# Patient Record
Sex: MALE | Race: WHITE | NOT HISPANIC OR LATINO | Employment: OTHER | URBAN - METROPOLITAN AREA
[De-identification: names, ages, dates, MRNs, and addresses within clinical notes are randomized per-mention and may not be internally consistent; named-entity substitution may affect disease eponyms.]

---

## 2017-01-03 ENCOUNTER — TELEPHONE (OUTPATIENT)
Dept: DERMATOLOGY | Facility: CLINIC | Age: 57
End: 2017-01-03

## 2017-01-03 DIAGNOSIS — L40.9 PSORIASIS: ICD-10-CM

## 2017-01-03 DIAGNOSIS — L40.0 PSORIASIS VULGARIS: ICD-10-CM

## 2017-01-03 RX ORDER — CLOBETASOL PROPIONATE 0.5 MG/G
OINTMENT TOPICAL
Qty: 60 G | Refills: 0 | Status: CANCELLED | OUTPATIENT
Start: 2017-01-03

## 2017-01-03 NOTE — TELEPHONE ENCOUNTER
----- Message from Caitlyn Weston sent at 1/3/2017  8:07 AM CST -----  Contact: self [phone]  He is calling to get refills on his topicals.  The pharmacy has sent requests.  Please call him with any questions.  Thank you!

## 2017-01-25 ENCOUNTER — TELEPHONE (OUTPATIENT)
Dept: FAMILY MEDICINE | Facility: CLINIC | Age: 57
End: 2017-01-25

## 2017-01-25 NOTE — TELEPHONE ENCOUNTER
Spoke with patient - requesting only to see Dr Cervantes   Didn't schedule - see telephone msg & office notes

## 2017-02-20 DIAGNOSIS — L40.0 PSORIASIS VULGARIS: ICD-10-CM

## 2017-02-20 RX ORDER — TRIAMCINOLONE ACETONIDE 1 MG/G
OINTMENT TOPICAL
Qty: 454 G | Refills: 0 | Status: SHIPPED | OUTPATIENT
Start: 2017-02-20 | End: 2017-09-22 | Stop reason: SDUPTHER

## 2017-02-20 RX ORDER — CLOBETASOL PROPIONATE 0.5 MG/G
OINTMENT TOPICAL
Qty: 60 G | Refills: 0 | Status: SHIPPED | OUTPATIENT
Start: 2017-02-20 | End: 2017-09-22 | Stop reason: SDUPTHER

## 2017-04-19 ENCOUNTER — TELEPHONE (OUTPATIENT)
Dept: FAMILY MEDICINE | Facility: CLINIC | Age: 57
End: 2017-04-19

## 2017-04-19 NOTE — TELEPHONE ENCOUNTER
----- Message from Roseann Cruz sent at 4/19/2017 10:06 AM CDT -----  Contact: self  Patient called requesting a prescription to help him sleep    He recently lost his mother and can not sleep    Please call [phone] to advise.     Thanks

## 2017-04-19 NOTE — TELEPHONE ENCOUNTER
Patient stated he is going to urgent care - advised Dr Joaquin is no longer his PCP - scheduled appointment with Dr Tiwari

## 2017-04-20 ENCOUNTER — TELEPHONE (OUTPATIENT)
Dept: FAMILY MEDICINE | Facility: CLINIC | Age: 57
End: 2017-04-20

## 2017-04-20 NOTE — TELEPHONE ENCOUNTER
Phone rings and goes to busy signal.   Also attempted to contact patient yesterday, 4/19/17, with no answer. Left message on mother's voicemail asking patient return call.
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
## 2017-04-24 ENCOUNTER — OFFICE VISIT (OUTPATIENT)
Dept: FAMILY MEDICINE | Facility: CLINIC | Age: 57
End: 2017-04-24
Payer: MEDICAID

## 2017-04-24 VITALS
OXYGEN SATURATION: 97 % | DIASTOLIC BLOOD PRESSURE: 100 MMHG | RESPIRATION RATE: 18 BRPM | WEIGHT: 268.75 LBS | HEIGHT: 72 IN | SYSTOLIC BLOOD PRESSURE: 160 MMHG | BODY MASS INDEX: 36.4 KG/M2 | TEMPERATURE: 99 F | HEART RATE: 90 BPM

## 2017-04-24 DIAGNOSIS — F41.9 ANXIETY: ICD-10-CM

## 2017-04-24 DIAGNOSIS — Z63.8 FAMILY CONFLICT: ICD-10-CM

## 2017-04-24 DIAGNOSIS — R35.89 POLYURIA: ICD-10-CM

## 2017-04-24 DIAGNOSIS — I10 ESSENTIAL HYPERTENSION: Primary | ICD-10-CM

## 2017-04-24 DIAGNOSIS — F51.01 PRIMARY INSOMNIA: ICD-10-CM

## 2017-04-24 LAB
BILIRUB SERPL-MCNC: NORMAL MG/DL
BLOOD URINE, POC: NORMAL
COLOR, POC UA: YELLOW
GLUCOSE UR QL STRIP: NORMAL
KETONES UR QL STRIP: NORMAL
LEUKOCYTE ESTERASE URINE, POC: NORMAL
NITRITE, POC UA: NORMAL
PH, POC UA: 6
PROTEIN, POC: NORMAL
SPECIFIC GRAVITY, POC UA: 1.01
UROBILINOGEN, POC UA: NORMAL

## 2017-04-24 PROCEDURE — 99999 PR PBB SHADOW E&M-EST. PATIENT-LVL IV: CPT | Mod: PBBFAC,,, | Performed by: NURSE PRACTITIONER

## 2017-04-24 PROCEDURE — 99214 OFFICE O/P EST MOD 30 MIN: CPT | Mod: S$PBB,,, | Performed by: NURSE PRACTITIONER

## 2017-04-24 PROCEDURE — 99214 OFFICE O/P EST MOD 30 MIN: CPT | Mod: PBBFAC,PO | Performed by: NURSE PRACTITIONER

## 2017-04-24 PROCEDURE — 81001 URINALYSIS AUTO W/SCOPE: CPT | Mod: PBBFAC,PO | Performed by: NURSE PRACTITIONER

## 2017-04-24 RX ORDER — ZOLPIDEM TARTRATE 5 MG/1
TABLET ORAL
Refills: 0 | COMMUNITY
Start: 2017-04-20 | End: 2017-09-12

## 2017-04-24 SDOH — SOCIAL DETERMINANTS OF HEALTH (SDOH): OTHER SPECIFIED PROBLEMS RELATED TO PRIMARY SUPPORT GROUP: Z63.8

## 2017-04-24 NOTE — MR AVS SNAPSHOT
[facility]  [address] [facility] [address]  [place] [address]  Phone: [phone]  Fax: [phone]                  Donta Beckham   2017 10:00 AM   Office Visit    Description:  Male : 1960   Provider:  Margaret Palomino NP   Department:  [facility]           Reason for Visit     Hypertension     sleep problems           Diagnoses this Visit        Comments    Essential hypertension    -  Primary     Primary insomnia         Anxiety         Family conflict         Polyuria                To Do List           Future Appointments        Provider Department Dept Phone    2017 3:20 PM ELSA Tiwari MD [facility] [phone]      Goals (5 Years of Data)              9/10/15    10/14/14    3/28/14    LDL CHOLESTEROL < 130   142.0  Invalid, Trig>400.0  162.4      [facility]     [facility] On Call Nurse Care Line -  Assistance  Unless otherwise directed by your provider, please contact [facility], our nurse care line that is available for  assistance.     Registered nurses in the [facility] Center provide: appointment scheduling, clinical advisement, health education, and other advisory services.  Call: [phone] (toll free)               Medications           Message regarding Medications     Verify the changes and/or additions to your medication regime listed below are the same as discussed with your clinician today.  If any of these changes or additions are incorrect, please notify your healthcare provider.        STOP taking these medications     alprazolam (XANAX) 2 MG Tab Daily prn           Verify that the below list of medications is an accurate representation of the medications you are currently taking.  If none reported, the list may be blank. If incorrect, please contact your healthcare provider. Carry this list with you in case of emergency.           Current Medications     busPIRone (BUSPAR) 10 MG tablet Take 1  tablet (10 mg total) by mouth 2 (two) times daily as needed.    clobetasol (TEMOVATE) 0.05 % external solution USE ON SCALP ONE TO TWO TIMES DAILY AS NEEDED FOR SCALING OR ITCHING    cloNIDine (CATAPRES) 0.1 MG tablet     multivit, iron, min no.8, FA 9-0.4 mg Tab Take 1 tablet by mouth once daily.    quetiapine (SEROQUEL) 50 MG tablet Take 1 tablet (50 mg total) by mouth every evening.    thiamine 100 MG tablet Take 1 tablet (100 mg total) by mouth once daily.    tizanidine 2 mg Cap 1 - 2 po q 6 hour prn muscle spasm    aspirin (ECOTRIN) 81 MG EC tablet Take 1 tablet (81 mg total) by mouth once daily.    clobetasol 0.05% (TEMOVATE) 0.05 % Oint APPLY EXTERNALLY TO THE AFFECTED AREA TWICE DAILY AS NEEDED. AVOID CHRONIC USE    triamcinolone acetonide 0.1% (KENALOG) 0.1 % ointment APPLY TO THE AFFECTED AREA TWICE DAILY FOR 1 TO 2 WEEKS THEN AS NEEDED. AVOID CHRONIC USE    valsartan (DIOVAN) 160 MG tablet Take 1 tablet (160 mg total) by mouth once daily.    zolpidem (AMBIEN) 5 MG Tab TK 1 T PO QHS PRN           Clinical Reference Information           Your Vitals Were     BP Pulse Temp Resp Height Weight    160/100 90 98.5 °F (36.9 °C) (Oral) 18 6' (1.829 m) 121.9 kg (268 lb 11.9 oz)    SpO2 BMI             97% 36.45 kg/m2         Blood Pressure          Most Recent Value    BP  (!)  160/100      Allergies as of 4/24/2017     No Known Allergies      Immunizations Administered on Date of Encounter - 4/24/2017     None      Orders Placed During Today's Visit      Normal Orders This Visit    POCT urinalysis, dipstick or tablet reag       [facility] Sign-Up     Activating your [facility] account is as easy as 1-2-3!     1) Visit [email or web address], select Sign Up Now, enter this activation code and your date of birth, then select Next.  [email or web address]  Expires: 5/13/2017  1:17 AM      2) Create a username and password to use when you visit [facility] in the future and select a security question in case you lose your password and  select Next.    3) Enter your e-mail address and click Sign Up!    Additional Information  If you have questions, please e-mail [email or web address] or call [phone] to talk to our [facility] staff. Remember, [facility] is NOT to be used for urgent needs. For medical emergencies, dial 911.         Instructions    HE has been instructed to go directly to [place] to be evaluated today       Language Assistance Services     ATTENTION: Language assistance services are available, free of charge. Please call [phone].      ATENCIÓN: Si habla español, tiene a heredia disposición servicios gratuitos de asistencia lingüística. Llame al [phone].     CHÚ Ý: N?u b?n nói Ti?ng Vi?t, có các d?ch v? h? tr? ngôn ng? mi?n phí dành cho b?n. G?i s? [phone].         [facility] complies with applicable Federal civil rights laws and does not discriminate on the basis of race, color, national origin, age, disability, or sex.

## 2017-04-24 NOTE — PROGRESS NOTES
"Subjective:       Patient ID: Donta Beckham is a 56 y.o. male.    Chief Complaint: Hypertension and sleep problems  This patient was seen in the ED on yesterday and again this morning for insomnia.   HPI   Patient states he is here because he cannot fall asleep.  States he "is not changing clothes, not eating, and cannot think straight".  Vitals:    04/24/17 0942   BP: (!) 160/100   Pulse: 90   Resp: 18   Temp: 98.5 °F (36.9 °C)     BP Readings from Last 3 Encounters:   04/24/17 (!) 160/100   04/24/17 (!) 168/92   04/23/17 (!) 146/91     Lab Results   Component Value Date    HGBA1C 5.4 10/14/2014     CMP  Sodium   Date Value Ref Range Status   04/23/2017 140 136 - 145 mmol/L Final     Potassium   Date Value Ref Range Status   04/23/2017 4.4 3.5 - 5.1 mmol/L Final     Chloride   Date Value Ref Range Status   04/23/2017 101 95 - 110 mmol/L Final     CO2   Date Value Ref Range Status   04/23/2017 31 22 - 31 mmol/L Final     Glucose   Date Value Ref Range Status   04/23/2017 119 (H) 70 - 110 mg/dL Final     Comment:     The ADA recommends the following guidelines for fasting glucose:  Normal:       less than 100 mg/dL  Prediabetes:  100 mg/dL to 125 mg/dL  Diabetes:     126 mg/dL or higher       BUN, Bld   Date Value Ref Range Status   04/23/2017 12 9 - 21 mg/dL Final     Creatinine   Date Value Ref Range Status   04/23/2017 0.99 0.50 - 1.40 mg/dL Final     Calcium   Date Value Ref Range Status   04/23/2017 9.4 8.4 - 10.2 mg/dL Final     Total Protein   Date Value Ref Range Status   03/29/2017 6.2 6.0 - 8.4 g/dL Final     Albumin   Date Value Ref Range Status   03/29/2017 3.4 (L) 3.5 - 5.2 g/dL Final     Total Bilirubin   Date Value Ref Range Status   03/29/2017 0.8 0.2 - 1.3 mg/dL Final     Comment:     For infants and newborns, interpretation of results should be based  on gestational age, weight and in agreement with clinical  observations.  Premature Infant recommended reference ranges:  Up to 24 " "hours.............<8.0 mg/dL  Up to 48 hours............<12.0 mg/dL  3-5 days..................<15.0 mg/dL  6-29 days.................<15.0 mg/dL       Alkaline Phosphatase   Date Value Ref Range Status   03/29/2017 50 38 - 145 U/L Final     AST ([place])   Date Value Ref Range Status   04/30/2016 40 17 - 59 U/L Final     AST   Date Value Ref Range Status   03/29/2017 47 17 - 59 U/L Final     ALT   Date Value Ref Range Status   03/29/2017 73 (H) 10 - 44 U/L Final     Anion Gap   Date Value Ref Range Status   04/23/2017 8 8 - 16 mmol/L Final     eGFR if    Date Value Ref Range Status   04/23/2017 >60 >60 mL/min/1.73 m^2 Final     eGFR if non    Date Value Ref Range Status   04/23/2017 >60 >60 mL/min/1.73 m^2 Final     Comment:     Calculation used to obtain the estimated glomerular filtration  rate (eGFR) is the CKD-EPI equation. Since race is unknown   in our information system, the eGFR values for   -American and Non--American patients are given   for each creatinine result.       Review of Systems    States he found his mother dead in December and states "my anxiety is extreme and I cannot fall asleep".  States he has been taking Taxis everywhere because he "cannot drive because he is too tired"  States he has only been taking his clonidine and not his valsartan  States he is not taking xanax because he no longer has any. He is here requesting more meds to help him sleep.  Objective:      Physical Exam   Constitutional: He is oriented to person, place, and time. He appears well-developed and well-nourished.   HENT:   Head: Normocephalic and atraumatic.   Right Ear: Hearing and external ear normal.   Left Ear: Hearing and external ear normal.   Nose: Nose normal.   Cardiovascular: Normal rate, regular rhythm and normal heart sounds.    Pulmonary/Chest: Effort normal and breath sounds normal.   Abdominal: Soft. Bowel sounds are normal.   Neurological: He is alert " and oriented to person, place, and time.   Skin: Skin is warm and dry.        Psychiatric: His behavior is normal. Judgment and thought content normal. His mood appears anxious. His affect is labile. His speech is delayed. Cognition and memory are normal. He expresses no homicidal and no suicidal ideation. He expresses no suicidal plans and no homicidal plans.   He appears to have a flat affect  Denies any suicidal or homicidal ideation   States he just cannot deal with everything going on with loss of mother. States having considerable family discord related to the financial issues with loss of mother.    States he last slept Thursday (3 days ago) and he stays up all night and watch TV to fall asleep  He has a slow speech and flat speech pattern   Nursing note and vitals reviewed.          Assessment & Plan:       Essential hypertension    Primary insomnia    Anxiety    Family conflict    Polyuria  -     POCT urinalysis, dipstick or tablet reag        Spent 30 minutes with this patient planning immediate evaluation by psychiatry through his insurance crisis line.   I advised his that I would not be prescribing any further meds to help him sleep.  Instructed him that I was concerned about his current mental and psychological status and felt he needed immediate evaluation by psychiatric professional.    I placed a call to the 24 hour behavioral health crisis line on his insurance cared at [phone] and spoke with Candace. She assisted with finding someone who could evaluate him immediately.  She spoke with Mita at [facility] and the patient states he will go directly there for an evaluation   A cab was called to send this patient directly to [facility] per his request    No Follow-up on file.

## 2017-04-25 ENCOUNTER — TELEPHONE (OUTPATIENT)
Dept: FAMILY MEDICINE | Facility: CLINIC | Age: 57
End: 2017-04-25

## 2017-04-26 NOTE — TELEPHONE ENCOUNTER
See message below from Dr. Tiwari. Please advise. Thank you.  Attempted to contact pt. No answer. Unable to LM due to busy signal.

## 2017-09-12 ENCOUNTER — OFFICE VISIT (OUTPATIENT)
Dept: FAMILY MEDICINE | Facility: CLINIC | Age: 57
End: 2017-09-12
Payer: MEDICAID

## 2017-09-12 VITALS
BODY MASS INDEX: 37.62 KG/M2 | HEIGHT: 72 IN | WEIGHT: 277.75 LBS | RESPIRATION RATE: 16 BRPM | HEART RATE: 100 BPM | SYSTOLIC BLOOD PRESSURE: 206 MMHG | DIASTOLIC BLOOD PRESSURE: 124 MMHG

## 2017-09-12 DIAGNOSIS — I10 ESSENTIAL HYPERTENSION: Primary | ICD-10-CM

## 2017-09-12 PROCEDURE — 99212 OFFICE O/P EST SF 10 MIN: CPT | Mod: S$PBB,,, | Performed by: FAMILY MEDICINE

## 2017-09-12 PROCEDURE — 99999 PR PBB SHADOW E&M-EST. PATIENT-LVL III: CPT | Mod: PBBFAC,,, | Performed by: FAMILY MEDICINE

## 2017-09-12 PROCEDURE — 99213 OFFICE O/P EST LOW 20 MIN: CPT | Mod: PBBFAC,PO | Performed by: FAMILY MEDICINE

## 2017-09-12 PROCEDURE — 3008F BODY MASS INDEX DOCD: CPT | Mod: ,,, | Performed by: FAMILY MEDICINE

## 2017-09-12 PROCEDURE — 3077F SYST BP >= 140 MM HG: CPT | Mod: ,,, | Performed by: FAMILY MEDICINE

## 2017-09-12 PROCEDURE — 3080F DIAST BP >= 90 MM HG: CPT | Mod: ,,, | Performed by: FAMILY MEDICINE

## 2017-09-14 ENCOUNTER — TELEPHONE (OUTPATIENT)
Dept: FAMILY MEDICINE | Facility: CLINIC | Age: 57
End: 2017-09-14

## 2017-09-14 NOTE — TELEPHONE ENCOUNTER
----- Message from Parul Go sent at 9/13/2017  2:59 PM CDT -----  Contact: self  Patient called regarding a phone call to receive today regarding his medications. Stating has not received call. Please contact [phone] (home)

## 2017-09-15 ENCOUNTER — TELEPHONE (OUTPATIENT)
Dept: FAMILY MEDICINE | Facility: CLINIC | Age: 57
End: 2017-09-15

## 2017-09-15 NOTE — TELEPHONE ENCOUNTER
----- Message from Parul Go sent at 9/14/2017  4:33 PM CDT -----  Contact: self  Patient called regarding missed call. Contacted since last week about medications, upset still waiting to be sent to pharmacy. Please contact [phone]
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
## 2017-09-15 NOTE — TELEPHONE ENCOUNTER
"Returned phone call from patient who is requesting refill requests for sleep aid and anxiety medications.   Call placed to patient to inquire about status and recent ED visit.   Patient states that the ED informed him that his blood pressure had dropped 60 points by the time he had arrived and that it was just an anxiety attack. States that ED MD told him to have his PCP issue provide him with his anxiety medication because that is all that was wrong with him.     Advised patient that in fact, he had not seen a provider in the ED, that only the triage nurse had seen him. Advised that his blood pressure was still elevated on assessment at ED. And that records indicated he had in fact left the hospital within 40 minutes of his arrival without treatment. The patient states that they weren't going to do anything for him anyway and that he goes to the emergency room all the time and they refuse to help him with his anxiety.   Explained that he was sent to the emergency room to address his blood pressure and did not follow through with treatment there, and further, was not honest about his encounter there.   It was also reminded that in his clinic visit, it was discussed that his anxiety should be followed through psychiatry since the patient has had stays in [place] for anxiety and alcohol abuse. The patient states no one ever told him to follow up with psychiatry and that his primary physician was supposed to take care of his anxiety medications.   Patient became louder stating, "You nitinks are supposed to be helping me and you aren't doing anything. You are the most uncaring nurse. You have no compassion at all. It was just like this with that corey Joaquin when I went to see him. It's bullshit." Patient requested my full name for the purpose of reporting me to management.     Advised patient that I was sorry that he felt this way, and that the PCP could not provide him with the medication he requested if he could not " follow the advise of his physician to seek treatment for both his anxiety and hypertension through the proper channels. The patient continued to yell out, and he was advised that the conversation would be ending at this time.

## 2017-09-15 NOTE — TELEPHONE ENCOUNTER
This patient left a medical facility AMA.  Dr. Tiwari has referred him to seek Psychiatric attention.  LF

## 2017-09-21 NOTE — TELEPHONE ENCOUNTER
Confirmed appt w/ Sayra dulce maria . Pt does not want to go to [facility] , pt wants to see Sayra.- Appt confirmed.-lp

## 2017-09-21 NOTE — TELEPHONE ENCOUNTER
----- Message from Caitlyn Weston sent at 9/21/2017 11:26 AM CDT -----  Contact: self [phone]  Call placed to pod. Patient returned your call, please call back.  Thank you!

## 2017-09-22 ENCOUNTER — OFFICE VISIT (OUTPATIENT)
Dept: FAMILY MEDICINE | Facility: CLINIC | Age: 57
End: 2017-09-22
Payer: MEDICAID

## 2017-09-22 VITALS
BODY MASS INDEX: 36.54 KG/M2 | WEIGHT: 269.75 LBS | RESPIRATION RATE: 22 BRPM | HEIGHT: 72 IN | TEMPERATURE: 98 F | SYSTOLIC BLOOD PRESSURE: 150 MMHG | OXYGEN SATURATION: 98 % | DIASTOLIC BLOOD PRESSURE: 100 MMHG | HEART RATE: 82 BPM

## 2017-09-22 DIAGNOSIS — Z00.00 LABORATORY EXAM ORDERED AS PART OF ROUTINE GENERAL MEDICAL EXAMINATION: ICD-10-CM

## 2017-09-22 DIAGNOSIS — G47.00 INSOMNIA, UNSPECIFIED TYPE: ICD-10-CM

## 2017-09-22 DIAGNOSIS — G62.9 PERIPHERAL POLYNEUROPATHY: ICD-10-CM

## 2017-09-22 DIAGNOSIS — L40.0 PSORIASIS VULGARIS: ICD-10-CM

## 2017-09-22 DIAGNOSIS — I10 ESSENTIAL HYPERTENSION: Primary | ICD-10-CM

## 2017-09-22 PROCEDURE — 99214 OFFICE O/P EST MOD 30 MIN: CPT | Mod: S$GLB,,, | Performed by: NURSE PRACTITIONER

## 2017-09-22 PROCEDURE — 3080F DIAST BP >= 90 MM HG: CPT | Mod: S$GLB,,, | Performed by: NURSE PRACTITIONER

## 2017-09-22 PROCEDURE — 3008F BODY MASS INDEX DOCD: CPT | Mod: S$GLB,,, | Performed by: NURSE PRACTITIONER

## 2017-09-22 PROCEDURE — 3077F SYST BP >= 140 MM HG: CPT | Mod: S$GLB,,, | Performed by: NURSE PRACTITIONER

## 2017-09-22 RX ORDER — GABAPENTIN 100 MG/1
100 CAPSULE ORAL 3 TIMES DAILY
Qty: 90 CAPSULE | Refills: 11 | Status: SHIPPED | OUTPATIENT
Start: 2017-09-22 | End: 2019-07-11 | Stop reason: CLARIF

## 2017-09-22 RX ORDER — TRIAMCINOLONE ACETONIDE 1 MG/G
OINTMENT TOPICAL
Qty: 454 G | Refills: 2 | Status: SHIPPED | OUTPATIENT
Start: 2017-09-22 | End: 2019-07-11 | Stop reason: CLARIF

## 2017-09-22 RX ORDER — TRAZODONE HYDROCHLORIDE 100 MG/1
200 TABLET ORAL NIGHTLY
Qty: 60 TABLET | Refills: 11 | Status: SHIPPED | OUTPATIENT
Start: 2017-09-22 | End: 2017-09-27

## 2017-09-22 RX ORDER — VALSARTAN 160 MG/1
160 TABLET ORAL DAILY
Qty: 90 TABLET | Refills: 3 | Status: SHIPPED | OUTPATIENT
Start: 2017-09-22 | End: 2017-09-27

## 2017-09-22 RX ORDER — CLOBETASOL PROPIONATE 0.5 MG/G
OINTMENT TOPICAL
Qty: 60 G | Refills: 2 | Status: SHIPPED | OUTPATIENT
Start: 2017-09-22 | End: 2019-07-11 | Stop reason: CLARIF

## 2017-09-25 ENCOUNTER — LAB VISIT (OUTPATIENT)
Dept: LAB | Facility: HOSPITAL | Age: 57
End: 2017-09-25
Attending: NURSE PRACTITIONER
Payer: MEDICAID

## 2017-09-25 ENCOUNTER — TELEPHONE (OUTPATIENT)
Dept: FAMILY MEDICINE | Facility: CLINIC | Age: 57
End: 2017-09-25

## 2017-09-25 DIAGNOSIS — I10 ESSENTIAL HYPERTENSION: ICD-10-CM

## 2017-09-25 DIAGNOSIS — G62.9 PERIPHERAL POLYNEUROPATHY: ICD-10-CM

## 2017-09-25 DIAGNOSIS — M62.830 BACK MUSCLE SPASM: ICD-10-CM

## 2017-09-25 LAB
ALBUMIN SERPL BCP-MCNC: 3.9 G/DL
ALP SERPL-CCNC: 76 U/L
ALT SERPL W/O P-5'-P-CCNC: 104 U/L
ANION GAP SERPL CALC-SCNC: 8 MMOL/L
AST SERPL-CCNC: 62 U/L
BASOPHILS # BLD AUTO: 0.05 K/UL
BASOPHILS NFR BLD: 0.5 %
BILIRUB SERPL-MCNC: 0.8 MG/DL
BUN SERPL-MCNC: 10 MG/DL
CALCIUM SERPL-MCNC: 9.6 MG/DL
CHLORIDE SERPL-SCNC: 105 MMOL/L
CHOLEST SERPL-MCNC: 229 MG/DL
CHOLEST/HDLC SERPL: 7.9 {RATIO}
CO2 SERPL-SCNC: 27 MMOL/L
CREAT SERPL-MCNC: 1.1 MG/DL
DIFFERENTIAL METHOD: ABNORMAL
EOSINOPHIL # BLD AUTO: 0.2 K/UL
EOSINOPHIL NFR BLD: 2 %
ERYTHROCYTE [DISTWIDTH] IN BLOOD BY AUTOMATED COUNT: 13.5 %
EST. GFR  (AFRICAN AMERICAN): >60 ML/MIN/1.73 M^2
EST. GFR  (NON AFRICAN AMERICAN): >60 ML/MIN/1.73 M^2
ESTIMATED AVG GLUCOSE: 154 MG/DL
GLUCOSE SERPL-MCNC: 134 MG/DL
HBA1C MFR BLD HPLC: 7 %
HCT VFR BLD AUTO: 48.9 %
HDLC SERPL-MCNC: 29 MG/DL
HDLC SERPL: 12.7 %
HGB BLD-MCNC: 16.6 G/DL
LDLC SERPL CALC-MCNC: 146.8 MG/DL
LYMPHOCYTES # BLD AUTO: 3.6 K/UL
LYMPHOCYTES NFR BLD: 36.3 %
MCH RBC QN AUTO: 32 PG
MCHC RBC AUTO-ENTMCNC: 33.9 G/DL
MCV RBC AUTO: 94 FL
MONOCYTES # BLD AUTO: 0.7 K/UL
MONOCYTES NFR BLD: 7.2 %
NEUTROPHILS # BLD AUTO: 5.3 K/UL
NEUTROPHILS NFR BLD: 53.9 %
NONHDLC SERPL-MCNC: 200 MG/DL
PLATELET # BLD AUTO: 291 K/UL
PMV BLD AUTO: 12.2 FL
POTASSIUM SERPL-SCNC: 4.3 MMOL/L
PROT SERPL-MCNC: 7.7 G/DL
RBC # BLD AUTO: 5.19 M/UL
SODIUM SERPL-SCNC: 140 MMOL/L
TRIGL SERPL-MCNC: 266 MG/DL
TSH SERPL DL<=0.005 MIU/L-ACNC: 1.66 UIU/ML
WBC # BLD AUTO: 9.81 K/UL

## 2017-09-25 PROCEDURE — 80061 LIPID PANEL: CPT

## 2017-09-25 PROCEDURE — 36415 COLL VENOUS BLD VENIPUNCTURE: CPT | Mod: PO

## 2017-09-25 PROCEDURE — 83036 HEMOGLOBIN GLYCOSYLATED A1C: CPT

## 2017-09-25 PROCEDURE — 80053 COMPREHEN METABOLIC PANEL: CPT

## 2017-09-25 PROCEDURE — 85025 COMPLETE CBC W/AUTO DIFF WBC: CPT

## 2017-09-25 PROCEDURE — 84443 ASSAY THYROID STIM HORMONE: CPT

## 2017-09-25 NOTE — TELEPHONE ENCOUNTER
Pt wants to wait until you get blood work results and see if he sleeps tonight . If no sleep tonight , he will go to [facility]Pt wants to know if you can give him a different sleep medicine-?lp

## 2017-09-25 NOTE — TELEPHONE ENCOUNTER
I agree that I think he needs to be in [place].  Is there any way we can contact [place] to see if he can go straight there?

## 2017-09-25 NOTE — TELEPHONE ENCOUNTER
----- Message from Teena Karenector sent at 9/25/2017  1:26 PM CDT -----  Contact: Self  Patient is requesting same day access, he is very weak, dizzy and claims he as not had any sleep.  The medication is not working. Patient is also requesting results from test he had this morning.  Please call [phone] (home).  Thanks

## 2017-09-25 NOTE — TELEPHONE ENCOUNTER
"Pt says he had labs done this am . Pt is having anxiety and not sleeping . Pt feels sick since being dx with diabetes. Pt feels weak, dizzy. Pt states that "he is not sure if he needs to be in [place]". Pt took 4 tablets of Trazodone last night and it didn't help. He took 2 tablets and it wasn't helping so 2 hours later he took 2 more tablets. Pt says he took Valsartan  on Friday and he had trouble urinating, made his heart feel funny. Pt stopped the Valsartan and he is urinating normally now . Pt says his pulse was 50 last night and he felt SOB. Today , pt feels weak and dizzy . He thinks this is sleep deprivation . /87, Pulse 60 . Pt did drink an Ensure and ate half of a sandwich.  Pls advise.--lp  "

## 2017-09-25 NOTE — PROGRESS NOTES
Subjective:       Patient ID: Donta Beckham is a 56 y.o. male.    Chief Complaint: Establish Care (Patient here to establish care.) and Insomnia    HPI  Review of Systems    Objective:      Physical Exam   Cardiovascular: Normal rate, regular rhythm and normal heart sounds.    Pulmonary/Chest: Effort normal and breath sounds normal.   Nursing note and vitals reviewed.      Assessment:       1. Essential hypertension        Plan:       Essential hypertension        Pt advised after nurse took vitals that needs higher level of care.  Offered EMS but he wanted a family member to drive.  He was stable during my exam.  No Follow-up on file.

## 2017-09-25 NOTE — TELEPHONE ENCOUNTER
There is not another sleep medicine as we discussed this in clinic.  I received another message stating that he is going to stay home because his wife is with him.

## 2017-09-26 RX ORDER — TIZANIDINE HYDROCHLORIDE 2 MG/1
CAPSULE, GELATIN COATED ORAL
Qty: 45 CAPSULE | Refills: 3 | Status: SHIPPED | OUTPATIENT
Start: 2017-09-26 | End: 2018-04-16

## 2017-09-26 NOTE — TELEPHONE ENCOUNTER
Spoke to pt. States he would like to set up his Hepatitis panel for Thursday morning in [place]. Also states that he will take all the suggested meds if you will send in Flexeril. States if you do not send in Flexeril he will not take any other suggested meds. Pt is willing to see hepatology but is unsure where to go if [facility]. Lab results in the out going mailbox. Please advise.

## 2017-09-26 NOTE — TELEPHONE ENCOUNTER
----- Message from Federico Rojas sent at 9/26/2017  2:06 PM CDT -----  Contact: [place] [facility])  X_ 1st Request  _ 2nd Request  _ 3rd Request    Who: [place] (Himanshu)    Why: Needs doctor to change prescription tizanidine 2 mg Cap from capsule to the tablets    What Number to Call Back: [phone]    When to Expect a call back: (Before the end of the day)  -- if call after 3:00 call back will be tomorrow.

## 2017-09-26 NOTE — TELEPHONE ENCOUNTER
Labs show elevated liver function tests.  I have put in orders for hepatitis panel in the past which she has not done.  His ultrasound shows a fatty liver so he should be seen by hepatology.  I have put in a referral.    His blood sugar does show diabetes.  His hemoglobin A1c is 7.0 at this time which does not require medication but I would like to try a low dose of metformin to try to help his blood sugar.  If he willing to take this?     His cholesterol is also too high and requires a statin.  I would not suspect that he would take this medication but if he would I would be glad to order this and recheck all of his labs in 2-3 months.    Please print labs in mail to him.

## 2017-09-26 NOTE — TELEPHONE ENCOUNTER
I have refilled his tizanidine.  I will not be prescribing Flexeril as this is sedating.  I have referred him for sleep study in the past and he has never done this to my knowledge.

## 2017-09-26 NOTE — TELEPHONE ENCOUNTER
Spoke to pt. States that he would like his blood work results. Made pt aware that once provider reviews labs and releases the results he will receive a phone call. Pt states understanding and is asking for a refill on Tizanidine 2 mg and would also like to get Flexeril sent in to his pharmacy as that has helped him in the past. Please advise.

## 2017-09-26 NOTE — TELEPHONE ENCOUNTER
Made pt aware providers decision. Pt is not happy. States that Sayra used to give him Ambien and now she is not allowed to write this rx. States that she can still write Flexeril and Elavil. Pt would like you to send one or both to the pharmacy. Reiterated to pt that NP will not send in Flexeril. Pt does not want a callback until the test results are ready to be reviewed as well. Pt also requesting a copy of labs to be printed and ready to be picked up at the . Timoteo advise.

## 2017-09-27 ENCOUNTER — NURSE TRIAGE (OUTPATIENT)
Dept: ADMINISTRATIVE | Facility: CLINIC | Age: 57
End: 2017-09-27

## 2017-09-27 ENCOUNTER — TELEPHONE (OUTPATIENT)
Dept: FAMILY MEDICINE | Facility: CLINIC | Age: 57
End: 2017-09-27

## 2017-09-27 PROBLEM — R07.9 CHEST PAIN WITH LOW RISK OF ACUTE CORONARY SYNDROME: Status: ACTIVE | Noted: 2017-09-27

## 2017-09-27 PROBLEM — I10 ESSENTIAL HYPERTENSION: Status: ACTIVE | Noted: 2017-09-27

## 2017-09-27 PROBLEM — I16.0 HYPERTENSIVE URGENCY: Status: ACTIVE | Noted: 2017-09-27

## 2017-09-27 PROBLEM — E66.01 MORBID OBESITY: Status: ACTIVE | Noted: 2017-09-27

## 2017-09-27 PROBLEM — R45.851 SUICIDAL IDEATION: Status: ACTIVE | Noted: 2017-09-27

## 2017-09-27 NOTE — TELEPHONE ENCOUNTER
----- Message from Lexy Sanders sent at 9/27/2017 12:13 PM CDT -----  Contact: Self  X   1st Request  _  2nd Request  _  3rd Request        Who: KASH DE LA ROSA [[id number]]    Why: Requesting a call back in regards to sleep deprivation. Pt states he was seen in ED.Pt was advised he needs to be prescribed some medication.Pt states he has weakness and loss of appetite.    Please return the call at earliest convenience. Thanks!    What Number to Call Back: [phone]    When to Expect a call back: (Within 24 hours)

## 2017-09-27 NOTE — PROGRESS NOTES
"Subjective:       Patient ID: Donta Beckham is a 56 y.o. male.    Chief Complaint: Peripheral Neuropathy (States toes feel like they are going to shoot off of bady. Anxiety)    The patient is here today with complaints of numbness and tingling to his lower extremities particularly the feet.  He is interested in trying gabapentin for this if possible.  He tells me this problem has been going on for months.  He was recently being seen at [facility] but tells me that he is not currently seeing those doctors over there because "they are assholes."     He has a long standing history of hypertension and has been extremely noncompliant with medications thus far.  He tells me the medication that he is on at this time, clonidine, is not working well either.  His blood pressure is elevated here in the clinic today.  He denies any headaches or vision changes.    He does have significant insomnia and brings me a list of antipsychotics that he would like to try for his insomnia.  He is currently taking trazodone 150 mg with little relief and sleep.  He is obese and has never had a sleep study as previously recommended.    He does have significant psoriasis as well and tells me he has not seen a dermatologist in many years.      Review of Systems   Constitutional: Negative for activity change and appetite change.   HENT: Negative for congestion, postnasal drip, rhinorrhea and sinus pressure.    Eyes: Negative for pain and redness.   Respiratory: Negative for choking and chest tightness.    Gastrointestinal: Negative for abdominal distention, abdominal pain, blood in stool, constipation, diarrhea, nausea and vomiting.   Endocrine: Negative for polydipsia and polyphagia.   Genitourinary: Negative for dysuria and hematuria.   Musculoskeletal: Negative for arthralgias and myalgias.   Skin: Negative for color change and rash.   Neurological: Negative for dizziness and headaches.   Psychiatric/Behavioral: Positive for decreased " concentration, dysphoric mood and sleep disturbance. Negative for agitation, behavioral problems and self-injury. The patient is nervous/anxious.        Objective:      Physical Exam   Cardiovascular:   Pulses:       Dorsalis pedis pulses are 2+ on the right side, and 2+ on the left side.        Posterior tibial pulses are 2+ on the right side, and 2+ on the left side.   Feet:   Right Foot:   Protective Sensation: 5 sites tested. 2 sites sensed.   Skin Integrity: Negative for ulcer, blister, skin breakdown, erythema, warmth, callus or dry skin.   Left Foot:   Protective Sensation: 5 sites tested. 2 sites sensed.   Skin Integrity: Negative for ulcer, blister, skin breakdown, erythema, warmth, callus or dry skin.   Skin:   Severe psoriatic lesions over entire body.       Assessment:       1. Essential hypertension    2. Peripheral polyneuropathy    3. Laboratory exam ordered as part of routine general medical examination    4. Psoriasis vulgaris    5. Insomnia, unspecified type    6. Obesity, Class II, BMI 35-39.9, with comorbidity        Plan:       Donta was seen today for peripheral neuropathy.    Diagnoses and all orders for this visit:    Essential hypertension  -     Comprehensive metabolic panel; Future  -     Hemoglobin A1c; Future  -     Lipid panel; Future  -     TSH; Future  -     URINALYSIS; Future  : valsartan (DIOVAN) 160 MG tablet; Take 1 tablet (160 mg total) by mouth once daily.  Continue clonidine    Peripheral polyneuropathy  -     CBC auto differential; Future  -     gabapentin (NEURONTIN) 100 MG capsule; Take 1 capsule (100 mg total) by mouth 3 (three) times daily.    Laboratory exam ordered as part of routine general medical examination    Psoriasis vulgaris  -     triamcinolone acetonide 0.1% (KENALOG) 0.1 % ointment; APPLY TO THE AFFECTED AREA TWICE DAILY FOR 1 TO 2 WEEKS THEN AS NEEDED. AVOID CHRONIC USE    -     clobetasol 0.05% (TEMOVATE) 0.05 % Oint; APPLY EXTERNALLY TO THE AFFECTED AREA  TWICE DAILY AS NEEDED. AVOID CHRONIC USE    Insomnia, unspecified type      -trazodone (DESYREL) 100 MG tablet; Take 2 tablets (200 mg total) by mouth every evening.  The patient is argumentative and not happy that I would not prescribe the list of antipsychotics that he is requesting for sleep.    Obesity, Class II, BMI 35-39.9, with comorbidity    Weight loss discussed.  Sleep study recommended.

## 2017-09-27 NOTE — TELEPHONE ENCOUNTER
"Was diagnosed with diabetes and is really feeling bad. Was not started on medication. Having trouble urinating. Having to push real hard to urinate. Has not urinated in 12 hours.    Reason for Disposition   [1] Unable to urinate (or only a few drops) > 4 hours AND     [2] bladder feels very full (e.g., palpable bladder or strong urge to urinate)    Answer Assessment - Initial Assessment Questions  1. SYMPTOM: "What's the main symptom you're concerned about?" (e.g., frequency, incontinence)      retention  2. ONSET: "When did the  ________  start?"      Past 12 hour  3. PAIN: "Is there any pain?" If so, ask: "How bad is it?" (Scale: 1-10; mild, moderate, severe)      Paralysis to foot, cant sleep  4. CAUSE: "What do you think is causing the symptoms?"      diabetes  5. OTHER SYMPTOMS: "Do you have any other symptoms?" (e.g., fever, flank pain, blood in urine, pain with urination)      Having to push  6. PREGNANCY: "Is there any chance you are pregnant?" "When was your last menstrual period?"      Not applicable    Protocols used: ST URINARY SYMPTOMS-A-      "

## 2017-09-27 NOTE — TELEPHONE ENCOUNTER
Pt informed to go to ER for alcohol withdrawal . Pt says he is hearing voices . Pt states he will go to ER .--lp

## 2017-09-27 NOTE — TELEPHONE ENCOUNTER
"Returned pt's call. Pt stated, "I went by ambulance to the ED because I was so weak I couldn't drive. I haven't slept in 3 days. I have severe anxiety. I can't eat or sleep. My neuropathy is getting worse, and my hands are shaking. I couldn't even remember my phone number. I think I'm going through withdrawals from drinking. They said I need something for this." Please advise.  "

## 2017-09-27 NOTE — TELEPHONE ENCOUNTER
----- Message from Shanelle Grijalva sent at 9/27/2017  2:50 PM CDT -----  Contact: pt  Pt states need something called in to help him sleep,please. So much anxiety having.[phone] ([place])           .  [facility] [employer] [address] [place] [address] [address] [facility] [address] [address]  Phone: [phone] Fax: [phone]

## 2017-09-29 DIAGNOSIS — E11.9 TYPE 2 DIABETES MELLITUS WITHOUT COMPLICATION: ICD-10-CM

## 2017-10-03 ENCOUNTER — TELEPHONE (OUTPATIENT)
Dept: FAMILY MEDICINE | Facility: CLINIC | Age: 57
End: 2017-10-03

## 2017-10-03 ENCOUNTER — NURSE TRIAGE (OUTPATIENT)
Dept: ADMINISTRATIVE | Facility: CLINIC | Age: 57
End: 2017-10-03

## 2017-10-03 NOTE — TELEPHONE ENCOUNTER
Reason for Disposition   Dizziness, lightheadedness, or weakness    Protocols used: ST HEART RATE AND HEARTBEAT [id number]    Donta is calling to report rapid heart rate when standing. Feels dizzy and gait unsteady.  Advised ER.

## 2017-10-06 DIAGNOSIS — Z12.11 COLON CANCER SCREENING: ICD-10-CM

## 2018-02-19 ENCOUNTER — TELEPHONE (OUTPATIENT)
Dept: FAMILY MEDICINE | Facility: CLINIC | Age: 58
End: 2018-02-19

## 2018-02-19 NOTE — TELEPHONE ENCOUNTER
"I am not going to "just sent in" medication. He needs to report to the ER as previously recommended  "

## 2018-02-19 NOTE — TELEPHONE ENCOUNTER
"Spoke with patient.  Last /104 at 0930 today.   Losartan 160mg at 0945 today.  Denies headaches, lightheadedness, weaknesses.   States his "gait is off a little bit".     Per patient, he "was taking clonidine 0.1 TID but it is running low so I alternate with losartan".   He also reports "lying in bed a lot, being depressed"    /120 was the "other night so I immediately took 2 clonidine 0.1mg and it went down to 134/100"     Advised patient go to ER however he requested medication refills and appointment. Please advise.   "

## 2018-02-19 NOTE — TELEPHONE ENCOUNTER
----- Message from Shanelle Grijalva sent at 2/19/2018  7:43 AM CST -----  Contact: pt  Pt  Calling states blood pressure at night 200/120. Tried to send to on call me and pt were disconnected..[phone] (home)

## 2018-02-19 NOTE — TELEPHONE ENCOUNTER
Spoke with pt, he stated that his BP is now 158/98 that he is weak and light headed. I advised pt to go to the ED, pt agreed and stated he would. Pt states he drinks 3-5 fifths a week of alcohol and has not had a drink in 5 days. Advised pt to report to the ED for further evaluation and he verbalized understanding.

## 2018-04-04 ENCOUNTER — TELEPHONE (OUTPATIENT)
Dept: FAMILY MEDICINE | Facility: CLINIC | Age: 58
End: 2018-04-04

## 2018-04-04 RX ORDER — LOSARTAN POTASSIUM 100 MG/1
100 TABLET ORAL DAILY
Qty: 90 TABLET | Refills: 0 | Status: SHIPPED | OUTPATIENT
Start: 2018-04-04 | End: 2018-04-12

## 2018-04-04 RX ORDER — CLONIDINE HYDROCHLORIDE 0.1 MG/1
0.1 TABLET ORAL 2 TIMES DAILY
Qty: 180 TABLET | Refills: 0 | Status: ON HOLD | OUTPATIENT
Start: 2018-04-04 | End: 2018-05-09 | Stop reason: HOSPADM

## 2018-04-04 NOTE — TELEPHONE ENCOUNTER
----- Message from Sanjana Santamaria sent at 4/4/2018  3:35 PM CDT -----  Patient states that his appointment was canceled by the office and need to see the doctor/NP as soon as possible for med refills.  Please call patient at [phone].

## 2018-04-10 ENCOUNTER — TELEPHONE (OUTPATIENT)
Dept: FAMILY MEDICINE | Facility: CLINIC | Age: 58
End: 2018-04-10

## 2018-04-10 NOTE — TELEPHONE ENCOUNTER
----- Message from Cassi Puentes sent at 4/10/2018 11:25 AM CDT -----  Type:  Patient Returning Call    Who Called:  patient  Who Left Message for Patient:  na  Does the patient know what this is regarding?: dagoberto Best Call Back Number:  [phone] Additional Information:  Placed a call to  Pod ,  Pt  Stated  He  Was  Informed  By  [facility]  Office that  He  Can t   Come  Back as a  Patient  ,  He  Had a  Scheduled  Em[facility] and    It  Was  Canceled // pt  Is calling to  Find  Out  Why  He  Was  In   His  Words let  Go  From  [facility]// please call

## 2018-04-10 NOTE — TELEPHONE ENCOUNTER
This cam from Dr Corrales on 9/15/17 because he was verbally abusive to the staff, any further questions he can contact shad

## 2018-04-11 NOTE — TELEPHONE ENCOUNTER
----- Message from Elizabet Walton sent at 4/11/2018 11:42 AM CDT -----  Contact: self  Refill on Seroquel 300mg    Callback number [phone]     [facility] [employer] [id number] [place] [address] AT [address] [address]  Phone: [phone] Fax: [phone]

## 2018-04-16 ENCOUNTER — HOSPITAL ENCOUNTER (EMERGENCY)
Facility: HOSPITAL | Age: 58
Discharge: HOME | End: 2018-04-16
Attending: EMERGENCY MEDICINE
Payer: MEDICAID

## 2018-04-16 VITALS
BODY MASS INDEX: 35.21 KG/M2 | OXYGEN SATURATION: 99 % | RESPIRATION RATE: 18 BRPM | HEIGHT: 72 IN | HEART RATE: 75 BPM | WEIGHT: 260 LBS | DIASTOLIC BLOOD PRESSURE: 102 MMHG | TEMPERATURE: 99 F | SYSTOLIC BLOOD PRESSURE: 169 MMHG

## 2018-04-16 DIAGNOSIS — G89.29 CHRONIC RIGHT-SIDED LOW BACK PAIN WITHOUT SCIATICA: ICD-10-CM

## 2018-04-16 DIAGNOSIS — M54.50 CHRONIC RIGHT-SIDED LOW BACK PAIN WITHOUT SCIATICA: ICD-10-CM

## 2018-04-16 DIAGNOSIS — F41.9 ANXIETY: Primary | ICD-10-CM

## 2018-04-16 DIAGNOSIS — G47.00 INSOMNIA, UNSPECIFIED TYPE: ICD-10-CM

## 2018-04-16 LAB
ALBUMIN SERPL BCP-MCNC: 4.2 G/DL
ALP SERPL-CCNC: 54 U/L
ALT SERPL W/O P-5'-P-CCNC: 39 U/L
AMPHET+METHAMPHET UR QL: NEGATIVE
ANION GAP SERPL CALC-SCNC: 8 MMOL/L
AST SERPL-CCNC: 26 U/L
BARBITURATES UR QL SCN>200 NG/ML: NEGATIVE
BASOPHILS # BLD AUTO: 0.08 K/UL
BASOPHILS NFR BLD: 0.7 %
BENZODIAZ UR QL SCN>200 NG/ML: NEGATIVE
BILIRUB SERPL-MCNC: 0.4 MG/DL
BILIRUB UR QL STRIP: NEGATIVE
BUN SERPL-MCNC: 15 MG/DL
BZE UR QL SCN: NEGATIVE
CALCIUM SERPL-MCNC: 9.5 MG/DL
CANNABINOIDS UR QL SCN: NEGATIVE
CHLORIDE SERPL-SCNC: 101 MMOL/L
CLARITY UR: CLEAR
CO2 SERPL-SCNC: 29 MMOL/L
COLOR UR: YELLOW
CREAT SERPL-MCNC: 1.1 MG/DL
CREAT UR-MCNC: 45 MG/DL
DIFFERENTIAL METHOD: ABNORMAL
EOSINOPHIL # BLD AUTO: 0.2 K/UL
EOSINOPHIL NFR BLD: 1.6 %
ERYTHROCYTE [DISTWIDTH] IN BLOOD BY AUTOMATED COUNT: 12.9 %
EST. GFR  (AFRICAN AMERICAN): >60 ML/MIN/1.73 M^2
EST. GFR  (NON AFRICAN AMERICAN): >60 ML/MIN/1.73 M^2
GLUCOSE SERPL-MCNC: 96 MG/DL
GLUCOSE UR QL STRIP: NEGATIVE
HCT VFR BLD AUTO: 44.7 %
HGB BLD-MCNC: 15.7 G/DL
HGB UR QL STRIP: NEGATIVE
IMM GRANULOCYTES # BLD AUTO: 0.04 K/UL
IMM GRANULOCYTES NFR BLD AUTO: 0.4 %
KETONES UR QL STRIP: NEGATIVE
LEUKOCYTE ESTERASE UR QL STRIP: NEGATIVE
LYMPHOCYTES # BLD AUTO: 2.6 K/UL
LYMPHOCYTES NFR BLD: 23.4 %
MAGNESIUM SERPL-MCNC: 2 MG/DL
MCH RBC QN AUTO: 32.2 PG
MCHC RBC AUTO-ENTMCNC: 35.1 G/DL
MCV RBC AUTO: 92 FL
MONOCYTES # BLD AUTO: 0.9 K/UL
MONOCYTES NFR BLD: 8.1 %
NEUTROPHILS # BLD AUTO: 7.2 K/UL
NEUTROPHILS NFR BLD: 65.8 %
NITRITE UR QL STRIP: NEGATIVE
NRBC BLD-RTO: 0 /100 WBC
OPIATES UR QL SCN: NEGATIVE
PCP UR QL SCN>25 NG/ML: NEGATIVE
PH UR STRIP: 6 [PH] (ref 5–8)
PLATELET # BLD AUTO: 299 K/UL
PMV BLD AUTO: 10.5 FL
POTASSIUM SERPL-SCNC: 4 MMOL/L
PROT SERPL-MCNC: 7.4 G/DL
PROT UR QL STRIP: NEGATIVE
RBC # BLD AUTO: 4.88 M/UL
SODIUM SERPL-SCNC: 138 MMOL/L
SP GR UR STRIP: <=1.005 (ref 1–1.03)
TOXICOLOGY INFORMATION: NORMAL
URN SPEC COLLECT METH UR: NORMAL
UROBILINOGEN UR STRIP-ACNC: NEGATIVE EU/DL
WBC # BLD AUTO: 10.96 K/UL

## 2018-04-16 PROCEDURE — 80307 DRUG TEST PRSMV CHEM ANLYZR: CPT

## 2018-04-16 PROCEDURE — 83735 ASSAY OF MAGNESIUM: CPT

## 2018-04-16 PROCEDURE — 36415 COLL VENOUS BLD VENIPUNCTURE: CPT

## 2018-04-16 PROCEDURE — 85025 COMPLETE CBC W/AUTO DIFF WBC: CPT

## 2018-04-16 PROCEDURE — 81003 URINALYSIS AUTO W/O SCOPE: CPT

## 2018-04-16 PROCEDURE — 99283 EMERGENCY DEPT VISIT LOW MDM: CPT

## 2018-04-16 PROCEDURE — 80053 COMPREHEN METABOLIC PANEL: CPT

## 2018-04-16 PROCEDURE — 81003 URINALYSIS AUTO W/O SCOPE: CPT | Mod: 59

## 2018-04-16 RX ORDER — HYDROXYZINE PAMOATE 50 MG/1
50 CAPSULE ORAL EVERY 6 HOURS PRN
Qty: 60 CAPSULE | Refills: 0 | Status: ON HOLD | OUTPATIENT
Start: 2018-04-16 | End: 2018-05-12 | Stop reason: HOSPADM
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
## 2018-05-09 PROBLEM — T46.4X5A ANGIOEDEMA DUE TO ANGIOTENSIN CONVERTING ENZYME INHIBITOR (ACE-I): Status: ACTIVE | Noted: 2018-05-09

## 2018-05-09 PROBLEM — R09.89 THROAT TIGHTNESS: Status: ACTIVE | Noted: 2018-05-09

## 2018-05-09 PROBLEM — T78.3XXA ANGIOEDEMA DUE TO ANGIOTENSIN CONVERTING ENZYME INHIBITOR (ACE-I): Status: ACTIVE | Noted: 2018-05-09

## 2018-05-09 PROBLEM — R06.02 SOB (SHORTNESS OF BREATH): Status: ACTIVE | Noted: 2018-05-09

## 2018-05-12 PROBLEM — J38.7 LARYNGEAL MASS: Status: ACTIVE | Noted: 2018-05-12

## 2018-05-12 PROBLEM — E11.9 DIABETES MELLITUS: Status: ACTIVE | Noted: 2018-05-12

## 2018-05-12 PROBLEM — G47.30 SLEEP APNEA: Status: ACTIVE | Noted: 2018-05-12

## 2018-05-12 PROBLEM — J98.8 NARROWING OF AIRWAY: Status: ACTIVE | Noted: 2018-05-12

## 2018-05-12 PROBLEM — R07.89 CHEST DISCOMFORT: Status: ACTIVE | Noted: 2018-05-12

## 2019-06-12 PROBLEM — F30.9 MANIA: Status: ACTIVE | Noted: 2019-06-12

## 2019-07-11 ENCOUNTER — HOSPITAL ENCOUNTER (EMERGENCY)
Facility: HOSPITAL | Age: 59
Discharge: HOME | End: 2019-07-11
Attending: EMERGENCY MEDICINE
Payer: MEDICAID

## 2019-07-11 VITALS
BODY MASS INDEX: 35.89 KG/M2 | OXYGEN SATURATION: 99 % | SYSTOLIC BLOOD PRESSURE: 156 MMHG | RESPIRATION RATE: 18 BRPM | WEIGHT: 265 LBS | DIASTOLIC BLOOD PRESSURE: 87 MMHG | HEIGHT: 72 IN | TEMPERATURE: 99 F | HEART RATE: 71 BPM

## 2019-07-11 DIAGNOSIS — R73.9 HYPERGLYCEMIA: Primary | ICD-10-CM

## 2019-07-11 LAB
ALBUMIN SERPL BCP-MCNC: 3.9 G/DL (ref 3.5–5.2)
ALP SERPL-CCNC: 102 U/L (ref 55–135)
ALT SERPL W/O P-5'-P-CCNC: 78 U/L (ref 10–44)
ANION GAP SERPL CALC-SCNC: 12 MMOL/L (ref 8–16)
AST SERPL-CCNC: 61 U/L (ref 10–40)
BASOPHILS # BLD AUTO: 0.09 K/UL (ref 0–0.2)
BASOPHILS NFR BLD: 0.9 % (ref 0–1.9)
BILIRUB SERPL-MCNC: 0.5 MG/DL (ref 0.1–1)
BUN SERPL-MCNC: 11 MG/DL (ref 6–20)
CALCIUM SERPL-MCNC: 9.8 MG/DL (ref 8.7–10.5)
CHLORIDE SERPL-SCNC: 100 MMOL/L (ref 95–110)
CO2 SERPL-SCNC: 24 MMOL/L (ref 23–29)
CREAT SERPL-MCNC: 1.1 MG/DL (ref 0.5–1.4)
DIFFERENTIAL METHOD: ABNORMAL
EOSINOPHIL # BLD AUTO: 0.2 K/UL (ref 0–0.5)
EOSINOPHIL NFR BLD: 2 % (ref 0–8)
ERYTHROCYTE [DISTWIDTH] IN BLOOD BY AUTOMATED COUNT: 12.8 % (ref 11.5–14.5)
EST. GFR  (AFRICAN AMERICAN): >60 ML/MIN/1.73 M^2
EST. GFR  (NON AFRICAN AMERICAN): >60 ML/MIN/1.73 M^2
GLUCOSE SERPL-MCNC: 359 MG/DL (ref 70–110)
HCT VFR BLD AUTO: 45.2 % (ref 40–54)
HGB BLD-MCNC: 15.8 G/DL (ref 14–18)
IMM GRANULOCYTES # BLD AUTO: 0.03 K/UL (ref 0–0.04)
LYMPHOCYTES # BLD AUTO: 2.4 K/UL (ref 1–4.8)
LYMPHOCYTES NFR BLD: 23.8 % (ref 18–48)
MCH RBC QN AUTO: 31.6 PG (ref 27–31)
MCHC RBC AUTO-ENTMCNC: 35 G/DL (ref 32–36)
MCV RBC AUTO: 90 FL (ref 82–98)
MONOCYTES # BLD AUTO: 0.8 K/UL (ref 0.3–1)
MONOCYTES NFR BLD: 8.3 % (ref 4–15)
NEUTROPHILS # BLD AUTO: 6.5 K/UL (ref 1.8–7.7)
NEUTROPHILS NFR BLD: 64.7 % (ref 38–73)
NRBC BLD-RTO: 0 /100 WBC
PLATELET # BLD AUTO: 256 K/UL (ref 150–350)
PMV BLD AUTO: 11.4 FL (ref 9.2–12.9)
POTASSIUM SERPL-SCNC: 4.2 MMOL/L (ref 3.5–5.1)
PROT SERPL-MCNC: 7.7 G/DL (ref 6–8.4)
RBC # BLD AUTO: 5 M/UL (ref 4.6–6.2)
SODIUM SERPL-SCNC: 136 MMOL/L (ref 136–145)
WBC # BLD AUTO: 10.02 K/UL (ref 3.9–12.7)

## 2019-07-11 PROCEDURE — 99284 EMERGENCY DEPT VISIT MOD MDM: CPT | Mod: 25

## 2019-07-11 PROCEDURE — 96372 THER/PROPH/DIAG INJ SC/IM: CPT | Mod: 59

## 2019-07-11 PROCEDURE — 85025 COMPLETE CBC W/AUTO DIFF WBC: CPT

## 2019-07-11 PROCEDURE — 25000003 PHARM REV CODE 250: Performed by: EMERGENCY MEDICINE

## 2019-07-11 PROCEDURE — 96360 HYDRATION IV INFUSION INIT: CPT

## 2019-07-11 PROCEDURE — 80053 COMPREHEN METABOLIC PANEL: CPT

## 2019-07-11 PROCEDURE — 63600175 PHARM REV CODE 636 W HCPCS: Performed by: EMERGENCY MEDICINE

## 2019-07-11 PROCEDURE — 36415 COLL VENOUS BLD VENIPUNCTURE: CPT

## 2019-07-11 RX ORDER — MIRTAZAPINE 30 MG/1
15 TABLET, FILM COATED ORAL NIGHTLY
Status: ON HOLD | COMMUNITY
End: 2021-12-09 | Stop reason: SDUPTHER

## 2019-07-11 RX ORDER — METFORMIN HYDROCHLORIDE 500 MG/1
500 TABLET ORAL 2 TIMES DAILY WITH MEALS
COMMUNITY
End: 2021-05-04

## 2019-07-11 RX ORDER — DOCUSATE SODIUM 100 MG/1
100 CAPSULE, LIQUID FILLED ORAL 2 TIMES DAILY
COMMUNITY
End: 2019-07-28

## 2019-07-11 RX ORDER — FOLIC ACID 1 MG/1
1 TABLET ORAL DAILY
COMMUNITY
End: 2024-02-02 | Stop reason: CLARIF

## 2019-07-11 RX ORDER — IBUPROFEN 200 MG
1 TABLET ORAL
COMMUNITY
End: 2019-07-28

## 2019-07-11 RX ORDER — QUETIAPINE FUMARATE 400 MG/1
100 TABLET, FILM COATED ORAL NIGHTLY
COMMUNITY
End: 2021-11-24 | Stop reason: CLARIF

## 2019-07-11 RX ORDER — NEBIVOLOL 5 MG/1
5 TABLET ORAL DAILY
COMMUNITY
End: 2019-07-28

## 2019-07-11 RX ORDER — GABAPENTIN 300 MG/1
300 CAPSULE ORAL 3 TIMES DAILY PRN
Status: ON HOLD | COMMUNITY
End: 2021-12-09 | Stop reason: HOSPADM

## 2019-07-11 RX ADMIN — INSULIN HUMAN 8 UNITS: 100 INJECTION, SOLUTION PARENTERAL at 04:07

## 2019-07-11 RX ADMIN — SODIUM CHLORIDE, SODIUM LACTATE, POTASSIUM CHLORIDE, AND CALCIUM CHLORIDE 1000 ML: .6; .31; .03; .02 INJECTION, SOLUTION INTRAVENOUS at 04:07
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
## 2019-07-11 NOTE — DISCHARGE INSTRUCTIONS
Also have BP rechecked next week. Make sure you are taking your medications including increased dose of metformin recommended by PCP.

## 2019-07-28 ENCOUNTER — HOSPITAL ENCOUNTER (EMERGENCY)
Facility: HOSPITAL | Age: 59
Discharge: HOME | End: 2019-07-29
Attending: FAMILY MEDICINE
Payer: MEDICAID

## 2019-07-28 DIAGNOSIS — N30.90 CYSTITIS: Primary | ICD-10-CM

## 2019-07-28 PROCEDURE — 99284 EMERGENCY DEPT VISIT MOD MDM: CPT | Mod: 25

## 2019-07-28 PROCEDURE — 82962 GLUCOSE BLOOD TEST: CPT

## 2019-07-28 PROCEDURE — 96372 THER/PROPH/DIAG INJ SC/IM: CPT

## 2019-07-29 VITALS
BODY MASS INDEX: 36.16 KG/M2 | DIASTOLIC BLOOD PRESSURE: 96 MMHG | RESPIRATION RATE: 20 BRPM | HEART RATE: 92 BPM | WEIGHT: 267 LBS | HEIGHT: 72 IN | SYSTOLIC BLOOD PRESSURE: 186 MMHG | OXYGEN SATURATION: 95 % | TEMPERATURE: 99 F

## 2019-07-29 LAB
BACTERIA #/AREA URNS HPF: NORMAL /HPF
BASOPHILS # BLD AUTO: 0.12 K/UL (ref 0–0.2)
BASOPHILS NFR BLD: 1.2 % (ref 0–1.9)
BILIRUB UR QL STRIP: NEGATIVE
CLARITY UR: CLEAR
COLOR UR: YELLOW
CRP SERPL-MCNC: 1.38 MG/DL (ref 0–0.75)
DIFFERENTIAL METHOD: ABNORMAL
EOSINOPHIL # BLD AUTO: 0.3 K/UL (ref 0–0.5)
EOSINOPHIL NFR BLD: 3 % (ref 0–8)
ERYTHROCYTE [DISTWIDTH] IN BLOOD BY AUTOMATED COUNT: 13.2 % (ref 11.5–14.5)
ERYTHROCYTE [SEDIMENTATION RATE] IN BLOOD BY WESTERGREN METHOD: 7 MM/HR (ref 0–10)
GLUCOSE UR QL STRIP: NEGATIVE
HCT VFR BLD AUTO: 44.3 % (ref 40–54)
HGB BLD-MCNC: 15.6 G/DL (ref 14–18)
HGB UR QL STRIP: NEGATIVE
IMM GRANULOCYTES # BLD AUTO: 0.03 K/UL (ref 0–0.04)
IMM GRANULOCYTES NFR BLD AUTO: 0.3 % (ref 0–0.5)
KETONES UR QL STRIP: NEGATIVE
LEUKOCYTE ESTERASE UR QL STRIP: NEGATIVE
LYMPHOCYTES # BLD AUTO: 2.2 K/UL (ref 1–4.8)
LYMPHOCYTES NFR BLD: 22.2 % (ref 18–48)
MCH RBC QN AUTO: 32.8 PG (ref 27–31)
MCHC RBC AUTO-ENTMCNC: 35.2 G/DL (ref 32–36)
MCV RBC AUTO: 93 FL (ref 82–98)
MICROSCOPIC COMMENT: NORMAL
MONOCYTES # BLD AUTO: 1 K/UL (ref 0.3–1)
MONOCYTES NFR BLD: 9.9 % (ref 4–15)
NEUTROPHILS # BLD AUTO: 6.3 K/UL (ref 1.8–7.7)
NEUTROPHILS NFR BLD: 63.4 % (ref 38–73)
NITRITE UR QL STRIP: POSITIVE
NRBC BLD-RTO: 0 /100 WBC
PH UR STRIP: 6 [PH] (ref 5–8)
PLATELET # BLD AUTO: 233 K/UL (ref 150–350)
PMV BLD AUTO: 11 FL (ref 9.2–12.9)
POCT GLUCOSE: 189 MG/DL (ref 70–110)
PROT UR QL STRIP: NEGATIVE
RBC # BLD AUTO: 4.75 M/UL (ref 4.6–6.2)
SP GR UR STRIP: <=1.005 (ref 1–1.03)
URN SPEC COLLECT METH UR: ABNORMAL
UROBILINOGEN UR STRIP-ACNC: NEGATIVE EU/DL
WBC # BLD AUTO: 9.98 K/UL (ref 3.9–12.7)

## 2019-07-29 PROCEDURE — 25000003 PHARM REV CODE 250: Performed by: FAMILY MEDICINE

## 2019-07-29 PROCEDURE — 87086 URINE CULTURE/COLONY COUNT: CPT

## 2019-07-29 PROCEDURE — 85651 RBC SED RATE NONAUTOMATED: CPT

## 2019-07-29 PROCEDURE — 86140 C-REACTIVE PROTEIN: CPT

## 2019-07-29 PROCEDURE — 81000 URINALYSIS NONAUTO W/SCOPE: CPT

## 2019-07-29 PROCEDURE — 85025 COMPLETE CBC W/AUTO DIFF WBC: CPT

## 2019-07-29 PROCEDURE — 63600175 PHARM REV CODE 636 W HCPCS: Performed by: FAMILY MEDICINE

## 2019-07-29 RX ORDER — CLONIDINE HYDROCHLORIDE 0.1 MG/1
0.1 TABLET ORAL
Status: COMPLETED | OUTPATIENT
Start: 2019-07-29 | End: 2019-07-29

## 2019-07-29 RX ORDER — CIPROFLOXACIN 250 MG/1
500 TABLET, FILM COATED ORAL
Status: COMPLETED | OUTPATIENT
Start: 2019-07-29 | End: 2019-07-29

## 2019-07-29 RX ORDER — CIPROFLOXACIN 500 MG/1
250 TABLET ORAL 2 TIMES DAILY
Qty: 30 TABLET | Refills: 0 | OUTPATIENT
Start: 2019-07-29 | End: 2019-07-29

## 2019-07-29 RX ORDER — KETOROLAC TROMETHAMINE 30 MG/ML
60 INJECTION, SOLUTION INTRAMUSCULAR; INTRAVENOUS
Status: COMPLETED | OUTPATIENT
Start: 2019-07-29 | End: 2019-07-29

## 2019-07-29 RX ADMIN — CIPROFLOXACIN HYDROCHLORIDE 500 MG: 250 TABLET, FILM COATED ORAL at 04:07

## 2019-07-29 RX ADMIN — CLONIDINE HYDROCHLORIDE 0.1 MG: 0.1 TABLET ORAL at 01:07

## 2019-07-29 RX ADMIN — KETOROLAC TROMETHAMINE 60 MG: 30 INJECTION, SOLUTION INTRAMUSCULAR at 03:07

## 2019-07-29 NOTE — ED NOTES
"Pt here with complaints of hyperglycemia x few months. Pt reports he was diagnosed with cystitis and given a "bag" of antibiotics and 6 Cipro pills and states " that's not enough medicine. Pt awake and alert. Respirations non labored. Pt also reports intermittent fever of up to 100 F.  "
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
## 2019-07-29 NOTE — DISCHARGE INSTRUCTIONS
Take medications as directed. Drink plenty of fluids, including lemonade, cranberry juice and water. Return to the ER if your condition worsens, if you develop fever, or if you are unable to keep fluids or medication down.   Follow up with a primary care provider in one week for recheck, sooner if no improvement.    Follow up with urology at their next available appointmnet, someone should be calling you in regards to your referral in the next 1-2 days.

## 2019-07-30 LAB — BACTERIA UR CULT: NO GROWTH

## 2019-09-16 RX ORDER — GABAPENTIN 100 MG/1
CAPSULE ORAL
Qty: 90 CAPSULE | Refills: 0 | OUTPATIENT
Start: 2019-09-16

## 2019-09-17 RX ORDER — TRAZODONE HYDROCHLORIDE 100 MG/1
TABLET ORAL
Qty: 60 TABLET | Refills: 0 | OUTPATIENT
Start: 2019-09-17

## 2020-02-01 NOTE — TELEPHONE ENCOUNTER
----- Message from Arleen Maddox sent at 1/25/2017  3:30 PM CST -----  Contact: [phone]  Patient requesting to speak with nurse concerning issues he is experiencing with diverticulitis and lower back pain/possible urinary tract infection/needs medication ordered/please call back at [phone] to advise.   Wound RN Visit (30 minutes)  Reason for visit: consult,low back with blanchanble erythema  Wound background: CP, SURGERY  Assessment: See Epic Wound Flowsheet  Recommendations:   Cleansing:gentle soap and water  Topical Agent: cavilon sin barrier film packets  Frequency: BID, and prn.

## 2021-01-13 ENCOUNTER — TELEPHONE (OUTPATIENT)
Dept: ENDOCRINOLOGY | Facility: CLINIC | Age: 61
End: 2021-01-13

## 2021-03-16 ENCOUNTER — TELEPHONE (OUTPATIENT)
Dept: ENDOCRINOLOGY | Facility: CLINIC | Age: 61
End: 2021-03-16

## 2021-04-24 LAB
ALBUMIN, URINE: 0.8 MG/L
CHOLESTEROL, TOTAL: 284
CHOLESTEROL, TOTAL: 284
HDLC SERPL-MCNC: 38 MG/DL
HDLC SERPL-MCNC: 38 MG/DL
LDL/HDL RATIO: ABNORMAL
LDLC SERPL CALC-MCNC: ABNORMAL MG/DL
TRIGL SERPL-MCNC: 439 MG/DL (ref 40–160)
TRIGL SERPL-MCNC: 439 MG/DL (ref 40–160)

## 2021-05-04 ENCOUNTER — OFFICE VISIT (OUTPATIENT)
Dept: ENDOCRINOLOGY | Facility: CLINIC | Age: 61
End: 2021-05-04
Payer: MEDICAID

## 2021-05-04 VITALS
SYSTOLIC BLOOD PRESSURE: 142 MMHG | HEIGHT: 74 IN | DIASTOLIC BLOOD PRESSURE: 110 MMHG | HEART RATE: 89 BPM | BODY MASS INDEX: 32.78 KG/M2 | WEIGHT: 255.38 LBS

## 2021-05-04 DIAGNOSIS — Z79.4 TYPE 2 DIABETES MELLITUS WITH HYPERGLYCEMIA, WITH LONG-TERM CURRENT USE OF INSULIN: Primary | ICD-10-CM

## 2021-05-04 DIAGNOSIS — I10 ESSENTIAL HYPERTENSION: ICD-10-CM

## 2021-05-04 DIAGNOSIS — E11.65 TYPE 2 DIABETES MELLITUS WITH HYPERGLYCEMIA, WITH LONG-TERM CURRENT USE OF INSULIN: Primary | ICD-10-CM

## 2021-05-04 DIAGNOSIS — F41.9 ANXIETY: ICD-10-CM

## 2021-05-04 DIAGNOSIS — E78.1 HYPERTRIGLYCERIDEMIA: ICD-10-CM

## 2021-05-04 DIAGNOSIS — F10.10 ALCOHOL ABUSE: ICD-10-CM

## 2021-05-04 PROCEDURE — 99999 PR PBB SHADOW E&M-EST. PATIENT-LVL IV: CPT | Mod: PBBFAC,,, | Performed by: NURSE PRACTITIONER

## 2021-05-04 PROCEDURE — 99205 OFFICE O/P NEW HI 60 MIN: CPT | Mod: S$PBB,,, | Performed by: NURSE PRACTITIONER

## 2021-05-04 PROCEDURE — 99214 OFFICE O/P EST MOD 30 MIN: CPT | Mod: PBBFAC,PN | Performed by: NURSE PRACTITIONER

## 2021-05-04 PROCEDURE — 99999 PR PBB SHADOW E&M-EST. PATIENT-LVL IV: ICD-10-PCS | Mod: PBBFAC,,, | Performed by: NURSE PRACTITIONER

## 2021-05-04 PROCEDURE — 95251 PR GLUCOSE MONITOR, 72 HOUR, PHYS INTERP: ICD-10-PCS | Mod: ,,, | Performed by: NURSE PRACTITIONER

## 2021-05-04 PROCEDURE — 95251 CONT GLUC MNTR ANALYSIS I&R: CPT | Mod: ,,, | Performed by: NURSE PRACTITIONER

## 2021-05-04 PROCEDURE — 99205 PR OFFICE/OUTPT VISIT, NEW, LEVL V, 60-74 MIN: ICD-10-PCS | Mod: S$PBB,,, | Performed by: NURSE PRACTITIONER

## 2021-05-04 RX ORDER — EZETIMIBE 10 MG/1
10 TABLET ORAL DAILY
Status: ON HOLD | COMMUNITY
Start: 2021-02-04 | End: 2021-05-11 | Stop reason: HOSPADM

## 2021-05-04 RX ORDER — INSULIN LISPRO 100 [IU]/ML
INJECTION, SOLUTION INTRAVENOUS; SUBCUTANEOUS
COMMUNITY
Start: 2021-03-22 | End: 2021-05-04 | Stop reason: SDUPTHER

## 2021-05-04 RX ORDER — FLASH GLUCOSE SENSOR
KIT MISCELLANEOUS
COMMUNITY
Start: 2021-04-15 | End: 2021-05-04 | Stop reason: SDUPTHER

## 2021-05-04 RX ORDER — INSULIN LISPRO 100 [IU]/ML
10 INJECTION, SOLUTION INTRAVENOUS; SUBCUTANEOUS
Qty: 30 ML | Refills: 6 | Status: SHIPPED | OUTPATIENT
Start: 2021-05-04 | End: 2022-05-13

## 2021-05-04 RX ORDER — INSULIN GLARGINE 100 [IU]/ML
30 INJECTION, SOLUTION SUBCUTANEOUS NIGHTLY
Qty: 1 BOX | Refills: 6 | Status: SHIPPED | OUTPATIENT
Start: 2021-05-04 | End: 2021-06-22 | Stop reason: CLARIF

## 2021-05-04 RX ORDER — FLASH GLUCOSE SENSOR
KIT MISCELLANEOUS
Qty: 2 KIT | Refills: 6 | Status: SHIPPED | OUTPATIENT
Start: 2021-05-04 | End: 2024-02-02 | Stop reason: CLARIF

## 2021-05-04 RX ORDER — PEN NEEDLE, DIABETIC 31 GX5/16"
1 NEEDLE, DISPOSABLE MISCELLANEOUS 4 TIMES DAILY PRN
COMMUNITY
Start: 2021-02-04

## 2021-05-04 RX ORDER — HYDROXYZINE PAMOATE 50 MG/1
50 CAPSULE ORAL EVERY 8 HOURS PRN
Status: ON HOLD | COMMUNITY
Start: 2021-03-12 | End: 2021-12-09 | Stop reason: HOSPADM

## 2021-05-08 PROBLEM — K56.609 SMALL BOWEL OBSTRUCTION: Status: ACTIVE | Noted: 2021-05-08

## 2021-05-26 ENCOUNTER — PATIENT OUTREACH (OUTPATIENT)
Dept: ADMINISTRATIVE | Facility: HOSPITAL | Age: 61
End: 2021-05-26

## 2021-06-10 ENCOUNTER — PATIENT OUTREACH (OUTPATIENT)
Dept: ADMINISTRATIVE | Facility: HOSPITAL | Age: 61
End: 2021-06-10

## 2021-06-22 PROBLEM — K57.92 ACUTE DIVERTICULITIS: Status: ACTIVE | Noted: 2021-06-22

## 2021-11-26 PROBLEM — K43.2 INCISIONAL HERNIA: Status: ACTIVE | Noted: 2021-11-26

## 2021-12-02 PROBLEM — D58.2 ELEVATED HEMOGLOBIN: Status: RESOLVED | Noted: 2021-12-02 | Resolved: 2021-12-02

## 2021-12-02 PROBLEM — F32.9 MAJOR DEPRESSIVE DISORDER: Status: ACTIVE | Noted: 2021-12-02

## 2021-12-02 PROBLEM — K46.9 HERNIA, ABDOMINAL: Status: ACTIVE | Noted: 2021-12-02

## 2021-12-02 PROBLEM — L03.311 ABDOMINAL WALL CELLULITIS: Status: ACTIVE | Noted: 2021-12-02

## 2021-12-02 PROBLEM — D58.2 ELEVATED HEMOGLOBIN: Status: ACTIVE | Noted: 2021-12-02

## 2021-12-02 PROBLEM — T51.2X1A ISOPROPYL ALCOHOL POISONING: Status: ACTIVE | Noted: 2021-12-02

## 2021-12-02 PROBLEM — R10.9 ABDOMINAL PAIN: Status: ACTIVE | Noted: 2021-12-02

## 2024-02-02 PROBLEM — E11.9 TYPE 2 DIABETES MELLITUS WITHOUT COMPLICATION, WITHOUT LONG-TERM CURRENT USE OF INSULIN: Status: ACTIVE | Noted: 2019-06-12

## 2024-02-02 PROBLEM — L03.115 CELLULITIS OF RIGHT LOWER EXTREMITY: Status: ACTIVE | Noted: 2024-02-02
